# Patient Record
Sex: FEMALE | Race: WHITE | NOT HISPANIC OR LATINO | URBAN - METROPOLITAN AREA
[De-identification: names, ages, dates, MRNs, and addresses within clinical notes are randomized per-mention and may not be internally consistent; named-entity substitution may affect disease eponyms.]

---

## 2019-05-17 ENCOUNTER — OFFICE VISIT (OUTPATIENT)
Dept: URBAN - METROPOLITAN AREA CLINIC 71 | Facility: CLINIC | Age: 69
End: 2019-05-17
Payer: COMMERCIAL

## 2019-05-17 DIAGNOSIS — H52.13 MYOPIA, BILATERAL: Primary | ICD-10-CM

## 2019-05-17 PROCEDURE — 92014 COMPRE OPH EXAM EST PT 1/>: CPT | Performed by: OPTOMETRIST

## 2019-05-17 PROCEDURE — 92015 DETERMINE REFRACTIVE STATE: CPT | Performed by: OPTOMETRIST

## 2019-05-17 PROCEDURE — 92310 CONTACT LENS FITTING OU: CPT | Performed by: OPTOMETRIST

## 2019-05-17 ASSESSMENT — INTRAOCULAR PRESSURE
OD: 15
OS: 15

## 2019-05-17 ASSESSMENT — VISUAL ACUITY
OS: 20/25+
OD: 20/20-

## 2019-05-17 NOTE — IMPRESSION/PLAN
Impression: Myopia, bilateral: H52.13. monovision SCLs: OD distance. NVA problematic. DVA stable. Plan: A glasses and contact lens prescription has been discussed and generated. Give contact lens trials to the patient. The patient should try and approve them. No contact lens follow-up is needed unless the patient has concerns with the final prescription.

## 2020-05-26 ENCOUNTER — OFFICE VISIT (OUTPATIENT)
Dept: URBAN - METROPOLITAN AREA CLINIC 71 | Facility: CLINIC | Age: 70
End: 2020-05-26
Payer: COMMERCIAL

## 2020-05-26 DIAGNOSIS — H43.813 VITREOUS DEGENERATION, BILATERAL: ICD-10-CM

## 2020-05-26 DIAGNOSIS — H25.13 AGE-RELATED NUCLEAR CATARACT, BILATERAL: ICD-10-CM

## 2020-05-26 PROCEDURE — 92310 CONTACT LENS FITTING OU: CPT | Performed by: OPTOMETRIST

## 2020-05-26 PROCEDURE — 92014 COMPRE OPH EXAM EST PT 1/>: CPT | Performed by: OPTOMETRIST

## 2020-05-26 ASSESSMENT — INTRAOCULAR PRESSURE
OS: 11
OD: 12

## 2020-05-26 ASSESSMENT — KERATOMETRY
OS: 44.75
OD: 44.88

## 2020-05-26 ASSESSMENT — VISUAL ACUITY
OS: 20/20
OD: 20/20

## 2020-05-26 NOTE — IMPRESSION/PLAN
Impression: Diagnosis: Age-related nuclear cataract, bilateral. Code: H25.13. vision stable OU Plan: Cataracts affecting vision some, but no surgery is currently recommended. The patient will monitor vision changes and contact us with any decrease in vision. Continue to monitor.

## 2020-05-26 NOTE — IMPRESSION/PLAN
Impression: Myopia, bilateral: H52.13. monovision: OD dist. Happy with current kay of CLs. Plan: A glasses prescription has been discussed and generated. A contact lens prescription has also been discussed and generated. Patient to call with any concerns.

## 2021-10-08 ENCOUNTER — OFFICE VISIT (OUTPATIENT)
Dept: URBAN - METROPOLITAN AREA CLINIC 71 | Facility: CLINIC | Age: 71
End: 2021-10-08
Payer: MEDICARE

## 2021-10-08 PROCEDURE — 99214 OFFICE O/P EST MOD 30 MIN: CPT | Performed by: OPTOMETRIST

## 2021-10-08 ASSESSMENT — INTRAOCULAR PRESSURE
OS: 14
OD: 14

## 2021-10-08 NOTE — IMPRESSION/PLAN
Impression: Diagnosis: Vitreous degeneration, bilateral. Code: S94.021. Stable OU. Plan: Continue to monitor with DE yearly. Call with new or worsening symptoms.

## 2021-11-15 ENCOUNTER — OFFICE VISIT (OUTPATIENT)
Dept: URBAN - METROPOLITAN AREA CLINIC 71 | Facility: CLINIC | Age: 71
End: 2021-11-15
Payer: COMMERCIAL

## 2021-11-15 PROCEDURE — 92310 CONTACT LENS FITTING OU: CPT | Performed by: OPTOMETRIST

## 2021-11-15 PROCEDURE — 92014 COMPRE OPH EXAM EST PT 1/>: CPT | Performed by: OPTOMETRIST

## 2021-11-15 ASSESSMENT — KERATOMETRY: OD: 45.00

## 2021-11-15 ASSESSMENT — VISUAL ACUITY
OD: 20/20
OS: 20/20

## 2021-11-15 ASSESSMENT — INTRAOCULAR PRESSURE
OS: 14
OD: 11

## 2021-11-15 NOTE — IMPRESSION/PLAN
Impression: Age-related nuclear cataract, bilateral. Code: H25.13. vision stable OU Plan: Continue to monitor

## 2021-11-15 NOTE — IMPRESSION/PLAN
Impression: Myopia, bilateral: H52.13. monovision: OD dist. Plan: A glasses prescription has been discussed and generated. A contact lens prescription has also been discussed and generated. Patient to call with any concerns.

## 2021-11-15 NOTE — IMPRESSION/PLAN
Impression: Vitreous degeneration, bilateral. Code: U47.258. Stable OU. Plan: Continue to monitor with DE yearly.

## 2022-10-10 ENCOUNTER — OFFICE VISIT (OUTPATIENT)
Dept: URBAN - METROPOLITAN AREA CLINIC 71 | Facility: CLINIC | Age: 72
End: 2022-10-10
Payer: MEDICARE

## 2022-10-10 DIAGNOSIS — H18.829 CORNEAL DISORDER DUE TO CONTACT LENS: ICD-10-CM

## 2022-10-10 DIAGNOSIS — H52.13 MYOPIA, BILATERAL: ICD-10-CM

## 2022-10-10 DIAGNOSIS — H25.13 AGE-RELATED NUCLEAR CATARACT, BILATERAL: Primary | ICD-10-CM

## 2022-10-10 DIAGNOSIS — H43.813 VITREOUS DEGENERATION, BILATERAL: ICD-10-CM

## 2022-10-10 PROCEDURE — 92014 COMPRE OPH EXAM EST PT 1/>: CPT | Performed by: OPTOMETRIST

## 2022-10-10 ASSESSMENT — INTRAOCULAR PRESSURE
OS: 17
OD: 15

## 2022-10-10 NOTE — IMPRESSION/PLAN
Impression: Vitreous degeneration, bilateral. Code: M74.417. PVD stable OU.  Plan: Continue to monitor with yearly DE.

## 2022-10-10 NOTE — IMPRESSION/PLAN
Impression: Age-related nuclear cataract, bilateral. Code: H25.13. vision stable OU Plan: Cataracts affecting vision some, but no surgery is currently recommended. The patient will monitor vision changes and contact us with any decrease in vision. Continue to monitor.

## 2022-10-10 NOTE — IMPRESSION/PLAN
Impression: Myopia, bilateral: H52.13. monovision: OD dist. Plan: A new Rx has NOT been generated or given to pt today. Okay to schedule refraction next available.  Pt advised to d/c CL use temporarily (see plan #2) prior to coming if for updated CL Rx.

## 2022-10-10 NOTE — IMPRESSION/PLAN
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 Plan: Discussed findings with pt. Recommend pt d/c CL use for a short period to allow front surface of her eye to fix itself, and see if that helps with her c/o blurred vision OD. Advise pt to let us know if she still has same symptoms after resuming CL use again.

## 2022-11-10 ENCOUNTER — OFFICE VISIT (OUTPATIENT)
Dept: URBAN - METROPOLITAN AREA CLINIC 71 | Facility: CLINIC | Age: 72
End: 2022-11-10
Payer: COMMERCIAL

## 2022-11-10 DIAGNOSIS — H43.813 VITREOUS DEGENERATION, BILATERAL: ICD-10-CM

## 2022-11-10 DIAGNOSIS — H25.13 AGE-RELATED NUCLEAR CATARACT, BILATERAL: ICD-10-CM

## 2022-11-10 DIAGNOSIS — H52.13 MYOPIA, BILATERAL: Primary | ICD-10-CM

## 2022-11-10 PROCEDURE — 92012 INTRM OPH EXAM EST PATIENT: CPT | Performed by: OPTOMETRIST

## 2022-11-10 PROCEDURE — 92310 CONTACT LENS FITTING OU: CPT | Performed by: OPTOMETRIST

## 2022-11-10 ASSESSMENT — VISUAL ACUITY
OS: 20/20
OD: 20/20

## 2022-11-10 NOTE — IMPRESSION/PLAN
Impression: Vitreous degeneration, bilateral. Code: B96.375. Pt was not dilated today.  Plan: Continue to monitor with yearly DE.

## 2022-11-10 NOTE — IMPRESSION/PLAN
Impression: Myopia, bilateral: H52.13. monovision: OD dist. Plan: A glasses and contact lens prescription has been discussed and generated. Astigmatism ignored OS for adaptation. Give contact lens trials to the patient. The patient should try and approve them. No contact lens follow-up is needed unless the patient has concerns with the final prescription, then can follow up with [facility] if needed.

## 2022-11-10 NOTE — IMPRESSION/PLAN
Impression: Age-related nuclear cataract, bilateral. Code: H25.13. vision stable OU Plan: Continue to monitor.

## 2023-10-16 ENCOUNTER — OFFICE VISIT (OUTPATIENT)
Dept: URBAN - METROPOLITAN AREA CLINIC 71 | Facility: CLINIC | Age: 73
End: 2023-10-16
Payer: MEDICARE

## 2023-10-16 DIAGNOSIS — H43.813 VITREOUS DEGENERATION, BILATERAL: ICD-10-CM

## 2023-10-16 DIAGNOSIS — H25.13 AGE-RELATED NUCLEAR CATARACT, BILATERAL: Primary | ICD-10-CM

## 2023-10-16 DIAGNOSIS — H52.13 MYOPIA, BILATERAL: ICD-10-CM

## 2023-10-16 PROCEDURE — 92310 CONTACT LENS FITTING OU: CPT | Performed by: OPTOMETRIST

## 2023-10-16 PROCEDURE — 92014 COMPRE OPH EXAM EST PT 1/>: CPT | Performed by: OPTOMETRIST

## 2023-10-16 ASSESSMENT — VISUAL ACUITY
OS: 20/20
OD: 20/25

## 2023-10-16 ASSESSMENT — INTRAOCULAR PRESSURE
OD: 12
OS: 13

## 2024-11-19 ENCOUNTER — OFFICE VISIT (OUTPATIENT)
Dept: URBAN - METROPOLITAN AREA CLINIC 71 | Facility: CLINIC | Age: 74
End: 2024-11-19
Payer: MEDICARE

## 2024-11-19 DIAGNOSIS — H25.13 AGE-RELATED NUCLEAR CATARACT, BILATERAL: ICD-10-CM

## 2024-11-19 DIAGNOSIS — H43.813 VITREOUS DEGENERATION, BILATERAL: Primary | ICD-10-CM

## 2024-11-19 DIAGNOSIS — H52.4 PRESBYOPIA: ICD-10-CM

## 2024-11-19 PROCEDURE — 92310 CONTACT LENS FITTING OU: CPT | Performed by: OPTOMETRIST

## 2024-11-19 PROCEDURE — 99213 OFFICE O/P EST LOW 20 MIN: CPT | Performed by: OPTOMETRIST

## 2024-11-19 ASSESSMENT — VISUAL ACUITY
OD: 20/25
OS: 20/25

## 2024-11-19 ASSESSMENT — INTRAOCULAR PRESSURE
OD: 13
OS: 15